# Patient Record
Sex: MALE | Race: WHITE | NOT HISPANIC OR LATINO | Employment: FULL TIME | URBAN - METROPOLITAN AREA
[De-identification: names, ages, dates, MRNs, and addresses within clinical notes are randomized per-mention and may not be internally consistent; named-entity substitution may affect disease eponyms.]

---

## 2017-03-17 ENCOUNTER — OFFICE VISIT (OUTPATIENT)
Dept: FAMILY MEDICINE | Facility: CLINIC | Age: 37
End: 2017-03-17
Payer: COMMERCIAL

## 2017-03-17 VITALS
HEART RATE: 95 BPM | SYSTOLIC BLOOD PRESSURE: 112 MMHG | HEIGHT: 74 IN | DIASTOLIC BLOOD PRESSURE: 82 MMHG | WEIGHT: 242.06 LBS | BODY MASS INDEX: 31.07 KG/M2

## 2017-03-17 DIAGNOSIS — J32.9 SINUSITIS, UNSPECIFIED CHRONICITY, UNSPECIFIED LOCATION: Primary | ICD-10-CM

## 2017-03-17 PROCEDURE — 99999 PR PBB SHADOW E&M-EST. PATIENT-LVL III: CPT | Mod: PBBFAC,,, | Performed by: NURSE PRACTITIONER

## 2017-03-17 PROCEDURE — 1160F RVW MEDS BY RX/DR IN RCRD: CPT | Mod: S$GLB,,, | Performed by: NURSE PRACTITIONER

## 2017-03-17 PROCEDURE — 99213 OFFICE O/P EST LOW 20 MIN: CPT | Mod: S$GLB,,, | Performed by: NURSE PRACTITIONER

## 2017-03-17 RX ORDER — FLUTICASONE PROPIONATE 50 MCG
2 SPRAY, SUSPENSION (ML) NASAL DAILY
Qty: 16 G | Refills: 0 | Status: SHIPPED | OUTPATIENT
Start: 2017-03-17 | End: 2017-07-03

## 2017-03-17 RX ORDER — AMOXICILLIN 875 MG/1
875 TABLET, FILM COATED ORAL EVERY 12 HOURS
Qty: 14 TABLET | Refills: 0 | Status: SHIPPED | OUTPATIENT
Start: 2017-03-17 | End: 2017-03-24

## 2017-03-17 RX ORDER — PROMETHAZINE HYDROCHLORIDE AND DEXTROMETHORPHAN HYDROBROMIDE 6.25; 15 MG/5ML; MG/5ML
5 SYRUP ORAL 3 TIMES DAILY PRN
Qty: 240 ML | Refills: 0 | Status: SHIPPED | OUTPATIENT
Start: 2017-03-17 | End: 2017-03-27

## 2017-03-17 NOTE — PROGRESS NOTES
Subjective:       Patient ID: Sanjeev Kohler is a 36 y.o. male.    Chief Complaint: Cough    HPI Comments: Patient is on imuran for ulcerative colitis.  Lipid Panel due on 1982  TETANUS VACCINE due on 09/02/2000  Influenza Vaccine due on 08/01/2016    Past Medical History:  Past Medical History:  No date: Ulcerative colitis  Past Surgical History:  No date: COLONOSCOPY  Review of patient's allergies indicates:  No Known Allergies  Current Outpatient Prescriptions on File Prior to Visit:  APRISO 0.375 gram Cp24, once daily. Take two pills twice a day, Disp: , Rfl:   azathioprine (IMURAN) 50 mg Tab, 50 mg 2 (two) times daily. , Disp: , Rfl:   (DISCONTINUED) azithromycin (Z-VASHTI) 250 MG tablet, Take 2 tablets by mouth on day one then one tablet daily for days 2-5, Disp: 6 tablet, Rfl: 0    No current facility-administered medications on file prior to visit.     Social History    Marital status:              Spouse name:                       Years of education:                 Number of children:               Occupational History    None on file    Social History Main Topics    Smoking status: Never Smoker                                                                Smokeless status: Never Used                        Alcohol use: No              Drug use: No              Sexual activity: Not on file          Other Topics            Concern    None on file    Social History Narrative    None on file      No family history on file.          Sinus Problem   This is a new problem. Episode onset: x 6 days. The problem has been gradually worsening since onset. There has been no fever. Associated symptoms include congestion, coughing, a hoarse voice, sinus pressure and sneezing. Pertinent negatives include no chills, diaphoresis, ear pain, headaches, neck pain, shortness of breath, sore throat or swollen glands. Past treatments include acetaminophen. The treatment provided no relief.     Review of Systems    Constitutional: Positive for fatigue. Negative for chills, diaphoresis and fever.   HENT: Positive for congestion, hoarse voice, postnasal drip, rhinorrhea, sinus pressure and sneezing. Negative for ear pain and sore throat.    Respiratory: Positive for cough. Negative for chest tightness and shortness of breath.    Cardiovascular: Negative.    Gastrointestinal: Negative.  Negative for abdominal pain, diarrhea, nausea and vomiting.   Genitourinary: Negative.    Musculoskeletal: Negative for neck pain.   Skin: Negative.  Negative for rash and wound.   Neurological: Negative for dizziness and headaches.       Objective:      Physical Exam   Constitutional: He is oriented to person, place, and time. No distress.   HENT:   Head: Head is without raccoon's eyes.   Right Ear: No middle ear effusion.   Left Ear:  No middle ear effusion.   Nose: Mucosal edema and rhinorrhea present. Right sinus exhibits maxillary sinus tenderness. Right sinus exhibits no frontal sinus tenderness. Left sinus exhibits maxillary sinus tenderness. Left sinus exhibits no frontal sinus tenderness.   Mouth/Throat: Uvula is midline. Posterior oropharyngeal erythema present.   Eyes: Pupils are equal, round, and reactive to light.   Neck: Normal range of motion.   Cardiovascular: Normal rate and regular rhythm.  Exam reveals no friction rub.    No murmur heard.  Pulmonary/Chest: Effort normal and breath sounds normal. No respiratory distress. He has no wheezes.   Abdominal: Soft. Bowel sounds are normal. He exhibits no distension. There is no tenderness.   Musculoskeletal: He exhibits no edema or deformity.   Neurological: He is alert and oriented to person, place, and time.   Skin: He is not diaphoretic. No erythema.   Psychiatric: He has a normal mood and affect. His behavior is normal.   Vitals reviewed.      Assessment:       1. Sinusitis, unspecified chronicity, unspecified location        Plan:       1. Sinusitis, unspecified chronicity,  unspecified location  Will go ahead and treat with antibiotics due to immune suppression, follow up if not resolving, immediately for any new or worsening Let GI doctor know about infection.   - amoxicillin (AMOXIL) 875 MG tablet; Take 1 tablet (875 mg total) by mouth every 12 (twelve) hours.  Dispense: 14 tablet; Refill: 0  - fluticasone (FLONASE) 50 mcg/actuation nasal spray; 2 sprays by Each Nare route once daily.  Dispense: 16 g; Refill: 0  - promethazine-dextromethorphan (PROMETHAZINE-DM) 6.25-15 mg/5 mL Syrp; Take 5 mLs by mouth 3 (three) times daily as needed.  Dispense: 240 mL; Refill: 0

## 2017-03-17 NOTE — MR AVS SNAPSHOT
[facility]  [address] [facility] [address]  [facility] [address]  Phone: [phone]  Fax: [phone]                  Sanjeev Kohler   3/17/2017 2:40 PM   Office Visit    Description:  Male : 1980   Provider:  Felisa Gambino NP   Department:  [facility]           Reason for Visit     Cough           Diagnoses this Visit        Comments    Sinusitis, unspecified chronicity, unspecified location    -  Primary            To Do List           Goals (5 Years of Data)     None       These Medications        Disp Refills Start End    amoxicillin (AMOXIL) 875 MG tablet 14 tablet 0 3/17/2017 3/24/2017    Take 1 tablet (875 mg total) by mouth every 12 (twelve) hours. - Oral    Pharmacy: [facility] [address] Ph #: [phone]       fluticasone (FLONASE) 50 mcg/actuation nasal spray 16 g 0 3/17/2017     2 sprays by Each Nare route once daily. - Each Nare    Pharmacy: [facility] [place] [id number] AT [facility] [address] Ph #: [phone]       promethazine-dextromethorphan (PROMETHAZINE-DM) 6.25-15 mg/5 mL Syrp 240 mL 0 3/17/2017 3/27/2017    Take 5 mLs by mouth 3 (three) times daily as needed. - Oral    Pharmacy: [facility] [employer] [place] [id number] AT [facility] [address] Ph #: [phone]         [facility]     [facility] Care [facility] -  Assistance  Registered nurses in the [facility] provide clinical advisement, health education, appointment booking, and other advisory services.  Call for this free service at [phone].             Medications           Message regarding Medications     Verify the changes and/or additions to your medication regime listed below are the same as discussed with your clinician today.  If any of these changes or additions are incorrect, please notify your healthcare provider.       "  START taking these NEW medications        Refills    amoxicillin (AMOXIL) 875 MG tablet 0    Sig: Take 1 tablet (875 mg total) by mouth every 12 (twelve) hours.    Class: Normal    Route: Oral    fluticasone (FLONASE) 50 mcg/actuation nasal spray 0    Si sprays by Each Nare route once daily.    Class: Normal    Route: Each Nare    promethazine-dextromethorphan (PROMETHAZINE-DM) 6.25-15 mg/5 mL Syrp 0    Sig: Take 5 mLs by mouth 3 (three) times daily as needed.    Class: Normal    Route: Oral      STOP taking these medications     azithromycin (Z-VASHTI) 250 MG tablet Take 2 tablets by mouth on day one then one tablet daily for days 2-5           Verify that the below list of medications is an accurate representation of the medications you are currently taking.  If none reported, the list may be blank. If incorrect, please contact your healthcare provider. Carry this list with you in case of emergency.           Current Medications     APRISO 0.375 gram Cp24 once daily. Take two pills twice a day    azathioprine (IMURAN) 50 mg Tab 50 mg 2 (two) times daily.     amoxicillin (AMOXIL) 875 MG tablet Take 1 tablet (875 mg total) by mouth every 12 (twelve) hours.    fluticasone (FLONASE) 50 mcg/actuation nasal spray 2 sprays by Each Nare route once daily.    promethazine-dextromethorphan (PROMETHAZINE-DM) 6.25-15 mg/5 mL Syrp Take 5 mLs by mouth 3 (three) times daily as needed.           Clinical Reference Information           Your Vitals Were     BP Pulse Height Weight BMI    112/82 95 6' 2" (1.88 m) 109.8 kg (242 lb 1 oz) 31.08 kg/m2      Blood Pressure          Most Recent Value    BP  112/82      Allergies as of 3/17/2017     No Known Allergies      Immunizations Administered on Date of Encounter - 3/17/2017     None      [facility] Sign-Up     Activating your [facility] account is as easy as 1-2-3!     1) Visit [email or web address], select Sign Up Now, enter this activation code and your date of birth, then select " Next.  [email or web address]  Expires: 5/1/2017  3:42 PM      2) Create a username and password to use when you visit [facility] in the future and select a security question in case you lose your password and select Next.    3) Enter your e-mail address and click Sign Up!    Additional Information  If you have questions, please e-mail [email or web address] or call [phone] to talk to our [employer] staff. Remember, [employer] is NOT to be used for urgent needs. For medical emergencies, dial 911.         Language Assistance Services     ATTENTION: Language assistance services are available, free of charge. Please call [phone].      ATENCIÓN: Si alma irma, tiene a peña disposición servicios gratuitos de asistencia lingüística. Llame al [phone].     ENZO Ý: N?u b?n nói Ti?ng Vi?t, có các d?ch v? h? tr? ngôn ng? mi?n phí dành cho b?n. G?i s? [phone].         [facility] complies with applicable Federal civil rights laws and does not discriminate on the basis of race, color, national origin, age, disability, or sex.

## 2017-06-09 ENCOUNTER — TELEPHONE (OUTPATIENT)
Dept: FAMILY MEDICINE | Facility: CLINIC | Age: 37
End: 2017-06-09

## 2017-06-09 NOTE — TELEPHONE ENCOUNTER
----- Message from Emma Galeana sent at 6/9/2017  8:45 AM CDT -----  Contact: wife, radha   Cold symptoms   Call back

## 2017-06-12 ENCOUNTER — TELEPHONE (OUTPATIENT)
Dept: FAMILY MEDICINE | Facility: CLINIC | Age: 37
End: 2017-06-12

## 2017-06-12 NOTE — TELEPHONE ENCOUNTER
----- Message from Nini Chang sent at 6/9/2017  1:34 PM CDT -----  Wife (Maribell) calling back concerning appointment for patient/please call back at [phone].

## 2017-07-03 ENCOUNTER — HOSPITAL ENCOUNTER (OUTPATIENT)
Dept: RADIOLOGY | Facility: HOSPITAL | Age: 37
Discharge: HOME | End: 2017-07-03
Attending: NURSE PRACTITIONER
Payer: COMMERCIAL

## 2017-07-03 ENCOUNTER — OFFICE VISIT (OUTPATIENT)
Dept: FAMILY MEDICINE | Facility: CLINIC | Age: 37
End: 2017-07-03
Payer: COMMERCIAL

## 2017-07-03 VITALS
WEIGHT: 241.19 LBS | BODY MASS INDEX: 30.95 KG/M2 | HEART RATE: 109 BPM | OXYGEN SATURATION: 95 % | HEIGHT: 74 IN | DIASTOLIC BLOOD PRESSURE: 80 MMHG | SYSTOLIC BLOOD PRESSURE: 112 MMHG | TEMPERATURE: 99 F

## 2017-07-03 DIAGNOSIS — R05.9 COUGH: Primary | ICD-10-CM

## 2017-07-03 DIAGNOSIS — D84.9 IMMUNOSUPPRESSION: ICD-10-CM

## 2017-07-03 DIAGNOSIS — R50.9 FEVER, UNSPECIFIED FEVER CAUSE: ICD-10-CM

## 2017-07-03 DIAGNOSIS — R05.9 COUGH: ICD-10-CM

## 2017-07-03 PROCEDURE — 99999 PR PBB SHADOW E&M-EST. PATIENT-LVL III: CPT | Mod: PBBFAC,,, | Performed by: NURSE PRACTITIONER

## 2017-07-03 PROCEDURE — 99213 OFFICE O/P EST LOW 20 MIN: CPT | Mod: S$GLB,,, | Performed by: NURSE PRACTITIONER

## 2017-07-03 PROCEDURE — 71020 XR CHEST PA AND LATERAL: CPT | Mod: 26,,, | Performed by: RADIOLOGY

## 2017-07-03 PROCEDURE — 71020 XR CHEST PA AND LATERAL: CPT | Mod: TC,PO

## 2017-07-03 RX ORDER — ALBUTEROL SULFATE 90 UG/1
2 AEROSOL, METERED RESPIRATORY (INHALATION) EVERY 6 HOURS PRN
Qty: 18 G | Refills: 0 | Status: SHIPPED | OUTPATIENT
Start: 2017-07-03 | End: 2017-12-11

## 2017-07-03 RX ORDER — LEVOFLOXACIN 500 MG/1
500 TABLET, FILM COATED ORAL DAILY
Qty: 10 TABLET | Refills: 0 | Status: SHIPPED | OUTPATIENT
Start: 2017-07-03 | End: 2017-12-11

## 2017-07-03 RX ORDER — PROMETHAZINE HYDROCHLORIDE AND DEXTROMETHORPHAN HYDROBROMIDE 6.25; 15 MG/5ML; MG/5ML
5 SYRUP ORAL 3 TIMES DAILY PRN
Qty: 240 ML | Refills: 0 | Status: SHIPPED | OUTPATIENT
Start: 2017-07-03 | End: 2017-07-13

## 2017-07-03 NOTE — PROGRESS NOTES
Subjective:       Patient ID: Sanjeev Kohler is a 36 y.o. male.    Chief Complaint: Cough (congestion) and Fever    Treated in March with Amoxil. Cleared up for a couple of months, got worse again about 4 weeks ago. Went to Urgent care and got a steroid shot and nasal spray, a little improvement but then congestion in chest worsened,  today had 103 fever and worsening congestion. He did take motrin. He is on imuran for ulcerative colitis.  Lipid Panel due on 1982  TETANUS VACCINE due on 09/02/2000    Past Medical History:  Past Medical History:  No date: Ulcerative colitis  Past Surgical History:  No date: COLONOSCOPY  Review of patient's allergies indicates:  No Known Allergies  Current Outpatient Prescriptions on File Prior to Visit:  APRISO 0.375 gram Cp24, once daily. Take two pills twice a day, Disp: , Rfl:   azathioprine (IMURAN) 50 mg Tab, 50 mg 2 (two) times daily. , Disp: , Rfl:   (DISCONTINUED) fluticasone (FLONASE) 50 mcg/actuation nasal spray, 2 sprays by Each Nare route once daily., Disp: 16 g, Rfl: 0    No current facility-administered medications on file prior to visit.     Social History    Marital status:              Spouse name:                       Years of education:                 Number of children:               Occupational History    None on file    Social History Main Topics    Smoking status: Never Smoker                                                                Smokeless tobacco: Never Used                        Alcohol use: No              Drug use: No              Sexual activity: Not on file          Other Topics            Concern    None on file    Social History Narrative    None on file      No family history on file.            Cough   Associated symptoms include chills, a fever and headaches. Pertinent negatives include no postnasal drip, rash, rhinorrhea, sore throat, shortness of breath or wheezing.   Fever    Associated symptoms include coughing and  headaches. Pertinent negatives include no abdominal pain, diarrhea, nausea, rash, sore throat, vomiting or wheezing.     Review of Systems   Constitutional: Positive for chills, fatigue and fever.   HENT: Positive for sinus pressure. Negative for postnasal drip, rhinorrhea and sore throat.    Respiratory: Positive for cough. Negative for shortness of breath and wheezing.    Cardiovascular: Negative.  Negative for leg swelling.   Gastrointestinal: Negative.  Negative for abdominal pain, constipation, diarrhea, nausea and vomiting.   Genitourinary: Negative.  Negative for frequency.   Musculoskeletal: Negative for neck pain.   Skin: Negative for rash.   Neurological: Positive for headaches.       Objective:      Physical Exam   Constitutional: He is oriented to person, place, and time. No distress.   HENT:   Head: Normocephalic and atraumatic.   Right Ear: External ear normal.   Left Ear: External ear normal.   Nose: Nose normal.   Mouth/Throat: Oropharynx is clear and moist. No oropharyngeal exudate.   Eyes: Pupils are equal, round, and reactive to light.   Cardiovascular: Normal heart sounds.  Tachycardia present.  Exam reveals no friction rub.    No murmur heard.  Pulmonary/Chest: No respiratory distress. He has decreased breath sounds. He has no wheezes. He has no rhonchi. He exhibits no tenderness.   Abdominal: Soft. Bowel sounds are normal. He exhibits no distension. There is no tenderness.   Musculoskeletal: He exhibits no edema.   Neurological: He is alert and oriented to person, place, and time.   Skin: No rash noted. He is not diaphoretic. No erythema.   Vitals reviewed.      Assessment:       1. Cough    2. Fever, unspecified fever cause    3. Immunosuppression        Plan:     Patient advised to contact GI doctor immediately and notify them of the fever and cough and get direction on the imuran and aspiro. CXR and cbc today. Start Levaquin, albuterol, and phenergan dm. Return for recheck on Wednesday to  ensure improving go to Er immediately for any new or worsening symptoms.   1. Cough    - CBC auto differential; Future  - X-Ray Chest PA And Lateral; Future  - albuterol 90 mcg/actuation inhaler; Inhale 2 puffs into the lungs every 6 (six) hours as needed for Wheezing. Rescue  Dispense: 18 g; Refill: 0  - levoFLOXacin (LEVAQUIN) 500 MG tablet; Take 1 tablet (500 mg total) by mouth once daily.  Dispense: 10 tablet; Refill: 0  - promethazine-dextromethorphan (PROMETHAZINE-DM) 6.25-15 mg/5 mL Syrp; Take 5 mLs by mouth 3 (three) times daily as needed.  Dispense: 240 mL; Refill: 0    2. Fever, unspecified fever cause    - CBC auto differential; Future  - X-Ray Chest PA And Lateral; Future  - albuterol 90 mcg/actuation inhaler; Inhale 2 puffs into the lungs every 6 (six) hours as needed for Wheezing. Rescue  Dispense: 18 g; Refill: 0  - levoFLOXacin (LEVAQUIN) 500 MG tablet; Take 1 tablet (500 mg total) by mouth once daily.  Dispense: 10 tablet; Refill: 0  - promethazine-dextromethorphan (PROMETHAZINE-DM) 6.25-15 mg/5 mL Syrp; Take 5 mLs by mouth 3 (three) times daily as needed.  Dispense: 240 mL; Refill: 0    3. Immunosuppression    - CBC auto differential; Future  - X-Ray Chest PA And Lateral; Future  - albuterol 90 mcg/actuation inhaler; Inhale 2 puffs into the lungs every 6 (six) hours as needed for Wheezing. Rescue  Dispense: 18 g; Refill: 0  - levoFLOXacin (LEVAQUIN) 500 MG tablet; Take 1 tablet (500 mg total) by mouth once daily.  Dispense: 10 tablet; Refill: 0  - promethazine-dextromethorphan (PROMETHAZINE-DM) 6.25-15 mg/5 mL Syrp; Take 5 mLs by mouth 3 (three) times daily as needed.  Dispense: 240 mL; Refill: 0

## 2017-07-05 ENCOUNTER — OFFICE VISIT (OUTPATIENT)
Dept: FAMILY MEDICINE | Facility: CLINIC | Age: 37
End: 2017-07-05
Payer: COMMERCIAL

## 2017-07-05 VITALS
HEART RATE: 94 BPM | OXYGEN SATURATION: 96 % | DIASTOLIC BLOOD PRESSURE: 78 MMHG | SYSTOLIC BLOOD PRESSURE: 110 MMHG | HEIGHT: 74 IN | TEMPERATURE: 98 F | WEIGHT: 241.38 LBS | RESPIRATION RATE: 19 BRPM | BODY MASS INDEX: 30.98 KG/M2

## 2017-07-05 DIAGNOSIS — R05.9 COUGH: Primary | ICD-10-CM

## 2017-07-05 PROCEDURE — 99999 PR PBB SHADOW E&M-EST. PATIENT-LVL III: CPT | Mod: PBBFAC,,, | Performed by: NURSE PRACTITIONER

## 2017-07-05 PROCEDURE — 99213 OFFICE O/P EST LOW 20 MIN: CPT | Mod: S$GLB,,, | Performed by: NURSE PRACTITIONER

## 2017-07-05 NOTE — PROGRESS NOTES
Subjective:       Patient ID: Sanjeev Kohler is a 36 y.o. male.    Chief Complaint: Cough    HPI   This patient is new to me. He presents to clinic for follow up of cough. He was seen by Felisa Gambino NP on 07/03/17 for this problem, CXR was normal, treated with levaquin as he is on immunosuppressive therapy for ulcerative colitis. He presents today with reports of feeling much better. He denies fever, chills or diaphoresis. He reports he is still coughing but it has improved substantially. He reports he spoke to GI who told him to continue meds as now.   Vitals:    07/05/17 1305   BP: 110/78   Pulse: 94   Resp: 19   Temp: 98.1 °F (36.7 °C)     Review of Systems   Constitutional: Negative.  Negative for appetite change, chills, diaphoresis, fatigue and fever.   HENT: Negative.  Negative for congestion, facial swelling and trouble swallowing.    Eyes: Negative.  Negative for discharge, redness and visual disturbance.   Respiratory: Positive for cough. Negative for chest tightness, shortness of breath and wheezing.    Cardiovascular: Negative.  Negative for chest pain and palpitations.   Gastrointestinal: Negative.  Negative for abdominal pain, constipation and diarrhea.   Genitourinary: Negative.  Negative for difficulty urinating and flank pain.   Musculoskeletal: Negative.  Negative for back pain and neck pain.   Skin: Negative.  Negative for rash and wound.   Neurological: Negative.  Negative for dizziness and light-headedness.   Hematological: Negative.    Psychiatric/Behavioral: Negative.  Negative for confusion. The patient is not nervous/anxious.        Past Medical History:   Diagnosis Date    Ulcerative colitis      Objective:      Physical Exam   Constitutional: He is oriented to person, place, and time. He appears well-developed and well-nourished.  Non-toxic appearance. He does not have a sickly appearance. He does not appear ill. No distress.   HENT:   Head: Normocephalic and atraumatic.   Right Ear:  Hearing and external ear normal.   Left Ear: Hearing and external ear normal.   Nose: Nose normal.   Mouth/Throat: Uvula is midline, oropharynx is clear and moist and mucous membranes are normal.   Eyes: Conjunctivae, EOM and lids are normal. Pupils are equal, round, and reactive to light. Right eye exhibits no discharge. Left eye exhibits no discharge.   Neck: Trachea normal and normal range of motion. Neck supple. No JVD present. No tracheal deviation present.   Cardiovascular: Normal rate, regular rhythm, S1 normal, S2 normal and normal heart sounds.   No extrasystoles are present. Exam reveals no gallop, no S3, no S4, no distant heart sounds and no friction rub.    No murmur heard.  Pulmonary/Chest: Effort normal and breath sounds normal. No accessory muscle usage. No respiratory distress. He has no decreased breath sounds. He has no wheezes. He has no rhonchi. He has no rales. He exhibits no tenderness.   No observed cough while in office or while taking deep breaths   Abdominal: Normal appearance. He exhibits no distension.   Musculoskeletal: Normal range of motion. He exhibits no edema or deformity.   Neurological: He is alert and oriented to person, place, and time. He has normal strength. No sensory deficit. Coordination and gait normal.   Skin: Skin is warm, dry and intact. No lesion and no rash noted. He is not diaphoretic. No erythema. No pallor.   Psychiatric: He has a normal mood and affect. His speech is normal and behavior is normal. Judgment and thought content normal. Cognition and memory are normal.   Nursing note and vitals reviewed.      Assessment:       1. Cough        Plan:       Cough   Continue levquin as now, finish Rx in full   Try delsym otc         Return if symptoms worsen or fail to improve.

## 2017-12-11 ENCOUNTER — HOSPITAL ENCOUNTER (OUTPATIENT)
Dept: RADIOLOGY | Facility: HOSPITAL | Age: 37
Discharge: HOME | End: 2017-12-11
Attending: NURSE PRACTITIONER
Payer: COMMERCIAL

## 2017-12-11 ENCOUNTER — OFFICE VISIT (OUTPATIENT)
Dept: FAMILY MEDICINE | Facility: CLINIC | Age: 37
End: 2017-12-11
Payer: COMMERCIAL

## 2017-12-11 VITALS
DIASTOLIC BLOOD PRESSURE: 80 MMHG | SYSTOLIC BLOOD PRESSURE: 118 MMHG | WEIGHT: 240.31 LBS | HEIGHT: 74 IN | OXYGEN SATURATION: 96 % | BODY MASS INDEX: 30.84 KG/M2 | TEMPERATURE: 99 F | HEART RATE: 106 BPM | RESPIRATION RATE: 18 BRPM

## 2017-12-11 DIAGNOSIS — R50.9 FEVER, UNSPECIFIED FEVER CAUSE: ICD-10-CM

## 2017-12-11 DIAGNOSIS — R68.89 FLU-LIKE SYMPTOMS: Primary | ICD-10-CM

## 2017-12-11 DIAGNOSIS — R68.89 FLU-LIKE SYMPTOMS: ICD-10-CM

## 2017-12-11 LAB
BACTERIA #/AREA URNS HPF: ABNORMAL /HPF
BILIRUB UR QL STRIP: ABNORMAL
CLARITY UR: CLEAR
COLOR UR: YELLOW
FLUAV AG SPEC QL IA: NEGATIVE
FLUBV AG SPEC QL IA: NEGATIVE
GLUCOSE UR QL STRIP: NEGATIVE
HGB UR QL STRIP: NEGATIVE
HYALINE CASTS #/AREA URNS LPF: 2 /LPF
KETONES UR QL STRIP: ABNORMAL
LEUKOCYTE ESTERASE UR QL STRIP: NEGATIVE
MICROSCOPIC COMMENT: ABNORMAL
NITRITE UR QL STRIP: NEGATIVE
PH UR STRIP: 6 [PH] (ref 5–8)
PROT UR QL STRIP: ABNORMAL
RBC #/AREA URNS HPF: 1 /HPF (ref 0–4)
SP GR UR STRIP: >=1.03 (ref 1–1.03)
SPECIMEN SOURCE: NORMAL
URN SPEC COLLECT METH UR: ABNORMAL
WBC #/AREA URNS HPF: 1 /HPF (ref 0–5)

## 2017-12-11 PROCEDURE — 99999 PR PBB SHADOW E&M-EST. PATIENT-LVL III: CPT | Mod: PBBFAC,,, | Performed by: NURSE PRACTITIONER

## 2017-12-11 PROCEDURE — 99214 OFFICE O/P EST MOD 30 MIN: CPT | Mod: S$GLB,,, | Performed by: NURSE PRACTITIONER

## 2017-12-11 PROCEDURE — 81000 URINALYSIS NONAUTO W/SCOPE: CPT | Mod: PO

## 2017-12-11 PROCEDURE — 71020 XR CHEST PA AND LATERAL: CPT | Mod: 26,,, | Performed by: RADIOLOGY

## 2017-12-11 PROCEDURE — 71020 XR CHEST PA AND LATERAL: CPT | Mod: TC,PO

## 2017-12-11 PROCEDURE — 87400 INFLUENZA A/B EACH AG IA: CPT | Mod: PO

## 2017-12-11 RX ORDER — FLUTICASONE PROPIONATE 50 MCG
1 SPRAY, SUSPENSION (ML) NASAL DAILY
Qty: 16 G | Refills: 0 | Status: SHIPPED | OUTPATIENT
Start: 2017-12-11 | End: 2018-07-30

## 2017-12-11 RX ORDER — LEVOFLOXACIN 500 MG/1
500 TABLET, FILM COATED ORAL DAILY
Qty: 10 TABLET | Refills: 0 | Status: SHIPPED | OUTPATIENT
Start: 2017-12-11 | End: 2017-12-27

## 2017-12-11 RX ORDER — ONDANSETRON HYDROCHLORIDE 8 MG/1
8 TABLET, FILM COATED ORAL EVERY 8 HOURS PRN
Qty: 30 TABLET | Refills: 0 | Status: SHIPPED | OUTPATIENT
Start: 2017-12-11 | End: 2018-07-30

## 2017-12-11 NOTE — PROGRESS NOTES
Subjective:       Patient ID: Sanjeev Kohler is a 37 y.o. male.    Chief Complaint: Nausea and Cough    Friday morning patient felt fine, started with a headache, fever up 101, and nausea. No vomiting or diarrhea. Sinus congestion, sore throat at night, no significant coughing. Took tylenol and ibuprofen, fever improved the next morning. [place] Saturday and Sunday during the day but both evening started to feel bad around 3 pm, fever headache nausea and sinus congestion and sore throat. Fever was 102-105 last 2 nights. No fever at all during the day. No headache or sinus congestion or sore throat during the day.  No recent changes in medications. Father has been ill with fatigue and vomiting. Has not eaten anything unusual.   He is on immunosuppressive therapy.   Lipid Panel due on 1982  TETANUS VACCINE due on 09/02/2000  Influenza Vaccine due on 08/01/2017    Past Medical History:  Past Medical History:  No date: Ulcerative colitis  Past Surgical History:  No date: COLONOSCOPY  Review of patient's allergies indicates:  No Known Allergies  Current Outpatient Prescriptions on File Prior to Visit:  APRISO 0.375 gram Cp24, once daily. Take two pills twice a day, Disp: , Rfl:   azathioprine (IMURAN) 50 mg Tab, 50 mg 2 (two) times daily. , Disp: , Rfl:   (DISCONTINUED) albuterol 90 mcg/actuation inhaler, Inhale 2 puffs into the lungs every 6 (six) hours as needed for Wheezing. Rescue, Disp: 18 g, Rfl: 0  (DISCONTINUED) levoFLOXacin (LEVAQUIN) 500 MG tablet, Take 1 tablet (500 mg total) by mouth once daily., Disp: 10 tablet, Rfl: 0    No current facility-administered medications on file prior to visit.     Social History    Marital status:              Spouse name:                       Years of education:                 Number of children:               Occupational History    None on file    Social History Main Topics    Smoking status: Never Smoker                                                                 Smokeless tobacco: Never Used                        Alcohol use: No              Drug use: No              Sexual activity: Not on file          Other Topics            Concern    None on file    Social History Narrative    None on file      No family history on file.            Review of Systems   Constitutional: Positive for appetite change (decreased appetite), chills, diaphoresis, fatigue and fever.   HENT: Positive for sinus pressure (pressure and congestion at night) and sore throat (only at night). Negative for postnasal drip, rhinorrhea and sinus pain.    Eyes: Negative.  Negative for pain, discharge and redness.   Respiratory: Positive for cough. Negative for shortness of breath and wheezing.    Cardiovascular: Negative.  Negative for chest pain and leg swelling.   Gastrointestinal: Positive for nausea (only at night). Negative for abdominal distention, abdominal pain, anal bleeding, blood in stool, constipation, diarrhea, rectal pain and vomiting.        Denies gerd or heartburn   Genitourinary: Negative.  Negative for dysuria, frequency and urgency.   Musculoskeletal: Positive for arthralgias and myalgias.   Skin: Negative.  Negative for rash and wound.   Neurological: Positive for headaches (only at night). Negative for dizziness, light-headedness and numbness.   Hematological: Negative.  Negative for adenopathy. Does not bruise/bleed easily.   Psychiatric/Behavioral: Negative.        Objective:      Physical Exam   Constitutional: He is oriented to person, place, and time. No distress.   HENT:   Head: Normocephalic and atraumatic.   Right Ear: External ear normal.   Left Ear: External ear normal.   Nose: Nose normal.   Mouth/Throat: Oropharynx is clear and moist. No oropharyngeal exudate.   Eyes: Pupils are equal, round, and reactive to light.   Neck: Normal range of motion.   Cardiovascular: Normal rate and regular rhythm.  Exam reveals no friction rub.    No murmur heard.  Pulmonary/Chest:  Effort normal and breath sounds normal. No respiratory distress. He has no wheezes.   Abdominal: Soft. Bowel sounds are normal. He exhibits no distension. There is no tenderness.   Musculoskeletal: He exhibits no edema.   Neurological: He is alert and oriented to person, place, and time.   Skin: No rash noted. He is not diaphoretic.   Vitals reviewed.      Assessment:       1. Flu-like symptoms    2. Fever, unspecified fever cause        Plan:       1. Flu-like symptoms  Flu negative, strep negative. UA concentrated but no bacteria. Increase fluids, follow up if not resolved by Wednesday, go to ed for worsening or new symptoms.  - Influenza antigen Nasal Swab  - CBC auto differential; Future  - Comprehensive metabolic panel; Future  - X-Ray Chest PA And Lateral; Future  - URINALYSIS  - POCT Rapid Strep A  - ondansetron (ZOFRAN) 8 MG tablet; Take 1 tablet (8 mg total) by mouth every 8 (eight) hours as needed for Nausea.  Dispense: 30 tablet; Refill: 0  - fluticasone (FLONASE) 50 mcg/actuation nasal spray; 1 spray by Each Nare route once daily.  Dispense: 16 g; Refill: 0    2. Fever, unspecified fever cause  CXR shows no pneumonia. Labs pending. Start levaquin due to high fever, on immunosuppressants. Contact GI right away and let them know about fever. Follow up if not resolved by Wednesday, go to ed for worsening or new symptoms.  - CBC auto differential; Future  - Comprehensive metabolic panel; Future  - X-Ray Chest PA And Lateral; Future  - URINALYSIS  - POCT Rapid Strep A  - PSA, Screening; Future  - levoFLOXacin (LEVAQUIN) 500 MG tablet; Take 1 tablet (500 mg total) by mouth once daily.  Dispense: 10 tablet; Refill: 0

## 2017-12-13 ENCOUNTER — PATIENT MESSAGE (OUTPATIENT)
Dept: FAMILY MEDICINE | Facility: CLINIC | Age: 37
End: 2017-12-13

## 2017-12-13 NOTE — TELEPHONE ENCOUNTER
----- Message from Christi Butt sent at 12/13/2017 12:47 PM CST -----  Contact: self  Patient [phone] is returning call from earlier today/please call

## 2017-12-19 ENCOUNTER — PATIENT MESSAGE (OUTPATIENT)
Dept: FAMILY MEDICINE | Facility: CLINIC | Age: 37
End: 2017-12-19

## 2017-12-19 RX ORDER — BENZONATATE 100 MG/1
100 CAPSULE ORAL 3 TIMES DAILY PRN
Qty: 30 CAPSULE | Refills: 0 | Status: SHIPPED | OUTPATIENT
Start: 2017-12-19 | End: 2017-12-29

## 2017-12-19 RX ORDER — PROMETHAZINE HYDROCHLORIDE AND DEXTROMETHORPHAN HYDROBROMIDE 6.25; 15 MG/5ML; MG/5ML
5 SYRUP ORAL 3 TIMES DAILY PRN
Qty: 240 ML | Refills: 0 | Status: SHIPPED | OUTPATIENT
Start: 2017-12-19 | End: 2017-12-27

## 2017-12-19 NOTE — TELEPHONE ENCOUNTER
Pt requested rx for cough went to ER on 12/172017, pt tested positive for mononucleosis and pancytopenia. Pt was given a liter of normal saline, tylenol and motrin. Please advise.

## 2017-12-20 NOTE — TELEPHONE ENCOUNTER
Pt is requesting to speak with you in regards to more symptoms not associated with mono, would you like to schedule him for another visit or call him. Please advise

## 2017-12-26 ENCOUNTER — PATIENT MESSAGE (OUTPATIENT)
Dept: FAMILY MEDICINE | Facility: CLINIC | Age: 37
End: 2017-12-26

## 2017-12-26 ENCOUNTER — TELEPHONE (OUTPATIENT)
Dept: FAMILY MEDICINE | Facility: CLINIC | Age: 37
End: 2017-12-26

## 2017-12-26 NOTE — TELEPHONE ENCOUNTER
I spoke with pt he is not much better. He ran 103. Fever yesterday at 5:30pm. His temp now is 100.0 now. He is complaining of not sleeping due to cough. He is c/o costochondritis. He said he has had it before and knows what if feels like. He will go to urgent care on [address].

## 2017-12-26 NOTE — TELEPHONE ENCOUNTER
----- Message from Annie Rondon sent at 12/26/2017  8:30 AM CST -----   Please call pt at [phone] / returning call

## 2017-12-26 NOTE — TELEPHONE ENCOUNTER
----- Message from Vivian Escalante sent at 12/26/2017  7:41 AM CST -----  Contact: Wife Maribell [phone]  Call placed to pod. Patient returned your call, please call back.  Thank you!

## 2017-12-27 PROBLEM — K51.30 ULCERATIVE RECTOSIGMOIDITIS WITHOUT COMPLICATION: Status: ACTIVE | Noted: 2017-12-27

## 2017-12-27 PROBLEM — T45.1X5A EFFECT OF IMMUNOSUPPRESSANT THERAPY: Status: ACTIVE | Noted: 2017-12-27

## 2017-12-28 NOTE — TELEPHONE ENCOUNTER
I spoke with pt, I read him Felisa's recc per her message. He understands. He made an appointment and saw a PCP yesterday. He understands if he ever feels worse he should follow up to make sure he is not developing bacterial infection. He understands this will take several weeks for mono to resolve

## 2018-02-16 PROBLEM — B00.9 HERPES SIMPLEX: Status: ACTIVE | Noted: 2018-02-16

## 2022-04-14 ENCOUNTER — OFFICE VISIT (OUTPATIENT)
Dept: FAMILY MEDICINE | Facility: CLINIC | Age: 42
End: 2022-04-14
Payer: COMMERCIAL

## 2022-04-14 VITALS
SYSTOLIC BLOOD PRESSURE: 120 MMHG | HEART RATE: 88 BPM | BODY MASS INDEX: 33.86 KG/M2 | OXYGEN SATURATION: 98 % | WEIGHT: 263.88 LBS | TEMPERATURE: 98 F | DIASTOLIC BLOOD PRESSURE: 74 MMHG | HEIGHT: 74 IN

## 2022-04-14 DIAGNOSIS — Z00.00 ROUTINE PHYSICAL EXAMINATION: ICD-10-CM

## 2022-04-14 DIAGNOSIS — J40 BRONCHITIS: Primary | ICD-10-CM

## 2022-04-14 PROCEDURE — 99204 PR OFFICE/OUTPT VISIT, NEW, LEVL IV, 45-59 MIN: ICD-10-PCS | Mod: S$GLB,,, | Performed by: INTERNAL MEDICINE

## 2022-04-14 PROCEDURE — 1160F RVW MEDS BY RX/DR IN RCRD: CPT | Mod: CPTII,S$GLB,, | Performed by: INTERNAL MEDICINE

## 2022-04-14 PROCEDURE — 3078F PR MOST RECENT DIASTOLIC BLOOD PRESSURE < 80 MM HG: ICD-10-PCS | Mod: CPTII,S$GLB,, | Performed by: INTERNAL MEDICINE

## 2022-04-14 PROCEDURE — 3074F SYST BP LT 130 MM HG: CPT | Mod: CPTII,S$GLB,, | Performed by: INTERNAL MEDICINE

## 2022-04-14 PROCEDURE — 99999 PR PBB SHADOW E&M-NEW PATIENT-LVL III: CPT | Mod: PBBFAC,,, | Performed by: INTERNAL MEDICINE

## 2022-04-14 PROCEDURE — 3008F PR BODY MASS INDEX (BMI) DOCUMENTED: ICD-10-PCS | Mod: CPTII,S$GLB,, | Performed by: INTERNAL MEDICINE

## 2022-04-14 PROCEDURE — 1159F MED LIST DOCD IN RCRD: CPT | Mod: CPTII,S$GLB,, | Performed by: INTERNAL MEDICINE

## 2022-04-14 PROCEDURE — 3074F PR MOST RECENT SYSTOLIC BLOOD PRESSURE < 130 MM HG: ICD-10-PCS | Mod: CPTII,S$GLB,, | Performed by: INTERNAL MEDICINE

## 2022-04-14 PROCEDURE — 3078F DIAST BP <80 MM HG: CPT | Mod: CPTII,S$GLB,, | Performed by: INTERNAL MEDICINE

## 2022-04-14 PROCEDURE — 99204 OFFICE O/P NEW MOD 45 MIN: CPT | Mod: S$GLB,,, | Performed by: INTERNAL MEDICINE

## 2022-04-14 PROCEDURE — 99999 PR PBB SHADOW E&M-NEW PATIENT-LVL III: ICD-10-PCS | Mod: PBBFAC,,, | Performed by: INTERNAL MEDICINE

## 2022-04-14 PROCEDURE — 1159F PR MEDICATION LIST DOCUMENTED IN MEDICAL RECORD: ICD-10-PCS | Mod: CPTII,S$GLB,, | Performed by: INTERNAL MEDICINE

## 2022-04-14 PROCEDURE — 1160F PR REVIEW ALL MEDS BY PRESCRIBER/CLIN PHARMACIST DOCUMENTED: ICD-10-PCS | Mod: CPTII,S$GLB,, | Performed by: INTERNAL MEDICINE

## 2022-04-14 PROCEDURE — 3008F BODY MASS INDEX DOCD: CPT | Mod: CPTII,S$GLB,, | Performed by: INTERNAL MEDICINE

## 2022-04-14 RX ORDER — CEFUROXIME AXETIL 500 MG/1
500 TABLET ORAL 2 TIMES DAILY
Qty: 20 TABLET | Refills: 0 | Status: SHIPPED | OUTPATIENT
Start: 2022-04-14 | End: 2022-04-24

## 2022-04-14 RX ORDER — BENZONATATE 100 MG/1
CAPSULE ORAL
Qty: 45 CAPSULE | Refills: 0 | Status: SHIPPED | OUTPATIENT
Start: 2022-04-14

## 2022-04-14 RX ORDER — CODEINE PHOSPHATE AND GUAIFENESIN 10; 100 MG/5ML; MG/5ML
5 SOLUTION ORAL EVERY 6 HOURS PRN
Qty: 180 ML | Refills: 0 | Status: SHIPPED | OUTPATIENT
Start: 2022-04-14 | End: 2022-04-24

## 2022-04-14 NOTE — PATIENT INSTRUCTIONS
Patient advised to use:  - intranasal steroid - Flonase  - Over the counter 24h Allegra (no drowsiness); generic ok.   - Benadryl at night  Use Mucinex or quaifenesin daily to thin mucous congestion.  Patient advised to take Mucinex twice daily, use nasal saline spray or wash (distilled water through a Neti Pot using  salt water (1/2 - 1 table spoon per 8 - 10 oz of cool water), increase oral fluids, and try warm steam inhalation.

## 2022-04-14 NOTE — PROGRESS NOTES
Subjective:       Patient ID: Sanjeev Kohler is a 41 y.o. male.    Chief Complaint: Sinus Problem    New patient complains of 1.5 weeks of moderate sinus pressure that progressed to a green sputum cough.  Associated with subjective fever last night.    UC - controlled on Imuran     Review of Systems   Constitutional: Negative for appetite change and fever.   HENT: Positive for postnasal drip and sinus pressure. Negative for nosebleeds and trouble swallowing.    Eyes: Negative for discharge and visual disturbance.   Respiratory: Positive for cough. Negative for choking and shortness of breath.    Cardiovascular: Negative for chest pain and palpitations.   Gastrointestinal: Negative for abdominal pain, nausea and vomiting.   Musculoskeletal: Negative for arthralgias and joint swelling.   Skin: Negative for rash and wound.   Neurological: Negative for dizziness and syncope.   Psychiatric/Behavioral: Negative for confusion and dysphoric mood.       Objective:      Vitals:    04/14/22 0830   BP: 120/74   Pulse: 88   Temp: 98.1 °F (36.7 °C)     Physical Exam  Vitals reviewed.   Eyes:      Conjunctiva/sclera: Conjunctivae normal.   Cardiovascular:      Rate and Rhythm: Normal rate and regular rhythm.   Pulmonary:      Effort: Pulmonary effort is normal.      Breath sounds: Normal breath sounds.   Musculoskeletal:      Cervical back: Normal range of motion.      Comments: Normal ROM bilateral    Skin:     General: Skin is warm and dry.   Neurological:      Cranial Nerves: No cranial nerve deficit (grossly intact).   Psychiatric:      Comments: Alert and orientated           Assessment:       1. Bronchitis    2. Routine physical examination        Plan:       Bronchitis  -     cefUROXime (CEFTIN) 500 MG tablet; Take 1 tablet (500 mg total) by mouth 2 (two) times daily. for 10 days  Dispense: 20 tablet; Refill: 0  -     benzonatate (TESSALON) 100 MG capsule; 1 - 2 po every 6 hours prn cough  Dispense: 45 capsule; Refill: 0  -      guaiFENesin-codeine 100-10 mg/5 ml (GUAIFENESIN AC)  mg/5 mL syrup; Take 5 mLs by mouth every 6 (six) hours as needed for Cough.  Dispense: 180 mL; Refill: 0    Routine physical examination  -     Comprehensive Metabolic Panel; Future; Expected date: 04/14/2022  -     CBC Auto Differential; Future; Expected date: 04/14/2022  -     Lipid Panel; Future; Expected date: 04/14/2022            Medication List with Changes/Refills   New Medications    BENZONATATE (TESSALON) 100 MG CAPSULE    1 - 2 po every 6 hours prn cough    CEFUROXIME (CEFTIN) 500 MG TABLET    Take 1 tablet (500 mg total) by mouth 2 (two) times daily. for 10 days    GUAIFENESIN-CODEINE 100-10 MG/5 ML (GUAIFENESIN AC)  MG/5 ML SYRUP    Take 5 mLs by mouth every 6 (six) hours as needed for Cough.   Current Medications    APRISO 0.375 GRAM CP24    once daily. Take two pills twice a day    AZATHIOPRINE (IMURAN) 50 MG TAB    50 mg 2 (two) times daily.    Discontinued Medications    AZITHROMYCIN (Z-VASHTI) 250 MG TABLET    Use as directed.    GUAIFENESIN (MUCINEX) 600 MG 12 HR TABLET    Take 1,200 mg by mouth 2 (two) times daily.    PSEUDOEPHEDRINE (SUDAFED) 120 MG 12 HR TABLET    Take 120 mg by mouth every 12 (twelve) hours.       Continue current management and monitor.    Counseled on regular exercise, maintenance of a healthy weight, balanced diet rich in fruits/vegetables and lean protein, and avoidance of unhealthy habits like smoking and excessive alcohol intake.   Also, counseled on importance of being compliant with medication, health appointments, diet and exercise.     Follow up in about 5 weeks (around 5/18/2022).  Annual

## 2023-07-25 NOTE — TELEPHONE ENCOUNTER
Mono takes weeks to resolve, the cough, fatigue and low grade fever may persist for quite a long time.  Anytime he has any worsening he should follow up to be sure he is not developing a bacterial infection. This patient needs a PCP, he is on long term medications that require monitoring and he has not seen a physician here, only [facility], I am listed as his primary and this is not allowed at [facility], he needs a new patient appointment with a PCP.    <-- Click to add NO pertinent Past Medical History

## 2024-11-18 ENCOUNTER — OFFICE VISIT (OUTPATIENT)
Dept: FAMILY MEDICINE | Facility: CLINIC | Age: 44
End: 2024-11-18
Payer: COMMERCIAL

## 2024-11-18 VITALS
WEIGHT: 266.13 LBS | SYSTOLIC BLOOD PRESSURE: 134 MMHG | HEART RATE: 89 BPM | OXYGEN SATURATION: 95 % | TEMPERATURE: 98 F | HEIGHT: 74 IN | BODY MASS INDEX: 34.15 KG/M2 | DIASTOLIC BLOOD PRESSURE: 88 MMHG

## 2024-11-18 DIAGNOSIS — M62.838 NECK MUSCLE SPASM: Primary | ICD-10-CM

## 2024-11-18 DIAGNOSIS — K51.30 ULCERATIVE RECTOSIGMOIDITIS WITHOUT COMPLICATION: ICD-10-CM

## 2024-11-18 PROCEDURE — 99214 OFFICE O/P EST MOD 30 MIN: CPT | Mod: S$GLB,,, | Performed by: NURSE PRACTITIONER

## 2024-11-18 PROCEDURE — 3079F DIAST BP 80-89 MM HG: CPT | Mod: CPTII,S$GLB,, | Performed by: NURSE PRACTITIONER

## 2024-11-18 PROCEDURE — 3075F SYST BP GE 130 - 139MM HG: CPT | Mod: CPTII,S$GLB,, | Performed by: NURSE PRACTITIONER

## 2024-11-18 PROCEDURE — 1159F MED LIST DOCD IN RCRD: CPT | Mod: CPTII,S$GLB,, | Performed by: NURSE PRACTITIONER

## 2024-11-18 PROCEDURE — 99999 PR PBB SHADOW E&M-EST. PATIENT-LVL III: CPT | Mod: PBBFAC,,, | Performed by: NURSE PRACTITIONER

## 2024-11-18 PROCEDURE — 3008F BODY MASS INDEX DOCD: CPT | Mod: CPTII,S$GLB,, | Performed by: NURSE PRACTITIONER

## 2024-11-18 RX ORDER — DICLOFENAC SODIUM 10 MG/G
2 GEL TOPICAL 4 TIMES DAILY
Qty: 450 G | Refills: 0 | Status: SHIPPED | OUTPATIENT
Start: 2024-11-18

## 2024-11-18 RX ORDER — METHYLPREDNISOLONE 4 MG/1
TABLET ORAL
Qty: 21 EACH | Refills: 0 | Status: SHIPPED | OUTPATIENT
Start: 2024-11-18 | End: 2024-12-09

## 2024-11-18 RX ORDER — TIZANIDINE 4 MG/1
4 TABLET ORAL NIGHTLY PRN
Qty: 20 TABLET | Refills: 0 | Status: SHIPPED | OUTPATIENT
Start: 2024-11-18

## 2024-11-18 NOTE — PROGRESS NOTES
Subjective:       Patient ID: Sanjeev Kohler is a 44 y.o. male.    Chief Complaint: Neck Pain    HPI  New patient to me presents for right sided neck pain  Slept on neck wrong--woke up 2 days ago with pain, stiffness.   Denies numbness, tingling, or radiating pain  Denies hx cervical issues  Has been taking 600mg ibuprofen TID without improvement     UC well controlled-- Dr Schmitz     Vitals:    11/18/24 0815   BP: 134/88   Pulse: 89   Temp: 97.7 °F (36.5 °C)     Review of Systems   Constitutional:  Negative for activity change and unexpected weight change.   HENT:  Negative for hearing loss, rhinorrhea and trouble swallowing.    Eyes:  Negative for discharge and visual disturbance.   Respiratory:  Negative for chest tightness and wheezing.    Cardiovascular:  Negative for chest pain and palpitations.   Gastrointestinal:  Negative for abdominal pain, anal bleeding, blood in stool, constipation, diarrhea and vomiting.   Endocrine: Negative for polydipsia and polyuria.   Genitourinary:  Negative for difficulty urinating, hematuria and urgency.   Musculoskeletal:  Positive for neck pain. Negative for arthralgias and joint swelling.   Neurological:  Negative for weakness and headaches.   Psychiatric/Behavioral:  Negative for confusion and dysphoric mood.        Past Medical History:   Diagnosis Date    Ulcerative colitis     Ulcerative colitis      Objective:      Physical Exam  Constitutional:       General: He is not in acute distress.     Appearance: He is well-developed. He is not ill-appearing, toxic-appearing or diaphoretic.   HENT:      Right Ear: Hearing normal.      Left Ear: Hearing normal.   Pulmonary:      Effort: No tachypnea or respiratory distress.   Musculoskeletal:      Cervical back: No edema, erythema or rigidity. Pain with movement and muscular tenderness present. No spinous process tenderness.   Skin:     Coloration: Skin is not pale.   Neurological:      Mental Status: He is alert and oriented to  person, place, and time.   Psychiatric:         Speech: Speech normal.         Behavior: Behavior normal.         Thought Content: Thought content normal.         Judgment: Judgment normal.         Assessment:       1. 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  Dispense: 20 tablet; Refill: 0  -     methylPREDNISolone (MEDROL DOSEPACK) 4 mg tablet; use as directed  Dispense: 21 each; Refill: 0  -     diclofenac sodium (VOLTAREN ARTHRITIS PAIN) 1 % Gel; Apply 2 g topically 4 (four) times daily.  Dispense: 450 g; Refill: 0    Ulcerative rectosigmoiditis without complication          education provided on supportive care, symptom monitoring and return precautions     Follow up for further evaluation if s/s worsen, fail to improve, or new symptoms arise.    Medication List with Changes/Refills   New Medications    DICLOFENAC SODIUM (VOLTAREN ARTHRITIS PAIN) 1 % GEL    Apply 2 g topically 4 (four) times daily.    METHYLPREDNISOLONE (MEDROL DOSEPACK) 4 MG TABLET    use as directed    TIZANIDINE (ZANAFLEX) 4 MG TABLET    Take 1 tablet (4 mg total) by mouth nightly as needed.   Current Medications    APRISO 0.375 GRAM CP24    once daily. Take two pills twice a day    AZATHIOPRINE (IMURAN) 50 MG TAB    50 mg 2 (two) times daily.    Discontinued Medications    BENZONATATE (TESSALON) 100 MG CAPSULE    1 - 2 po every 6 hours prn cough

## 2025-06-16 ENCOUNTER — OFFICE VISIT (OUTPATIENT)
Dept: FAMILY MEDICINE | Facility: CLINIC | Age: 45
End: 2025-06-16
Payer: COMMERCIAL

## 2025-06-16 VITALS
HEIGHT: 74 IN | DIASTOLIC BLOOD PRESSURE: 88 MMHG | OXYGEN SATURATION: 97 % | BODY MASS INDEX: 34.52 KG/M2 | HEART RATE: 100 BPM | WEIGHT: 268.94 LBS | TEMPERATURE: 98 F | SYSTOLIC BLOOD PRESSURE: 130 MMHG

## 2025-06-16 DIAGNOSIS — J32.9 SINUSITIS, UNSPECIFIED CHRONICITY, UNSPECIFIED LOCATION: Primary | ICD-10-CM

## 2025-06-16 PROCEDURE — 3079F DIAST BP 80-89 MM HG: CPT | Mod: CPTII,S$GLB,, | Performed by: NURSE PRACTITIONER

## 2025-06-16 PROCEDURE — 3075F SYST BP GE 130 - 139MM HG: CPT | Mod: CPTII,S$GLB,, | Performed by: NURSE PRACTITIONER

## 2025-06-16 PROCEDURE — 1159F MED LIST DOCD IN RCRD: CPT | Mod: CPTII,S$GLB,, | Performed by: NURSE PRACTITIONER

## 2025-06-16 PROCEDURE — 3008F BODY MASS INDEX DOCD: CPT | Mod: CPTII,S$GLB,, | Performed by: NURSE PRACTITIONER

## 2025-06-16 PROCEDURE — 99213 OFFICE O/P EST LOW 20 MIN: CPT | Mod: S$GLB,,, | Performed by: NURSE PRACTITIONER

## 2025-06-16 PROCEDURE — 99999 PR PBB SHADOW E&M-EST. PATIENT-LVL III: CPT | Mod: PBBFAC,,, | Performed by: NURSE PRACTITIONER

## 2025-06-16 RX ORDER — AMOXICILLIN AND CLAVULANATE POTASSIUM 875; 125 MG/1; MG/1
1 TABLET, FILM COATED ORAL EVERY 12 HOURS
Qty: 20 TABLET | Refills: 0 | Status: SHIPPED | OUTPATIENT
Start: 2025-06-16 | End: 2025-06-16

## 2025-06-16 RX ORDER — AMOXICILLIN AND CLAVULANATE POTASSIUM 875; 125 MG/1; MG/1
1 TABLET, FILM COATED ORAL EVERY 12 HOURS
Qty: 20 TABLET | Refills: 0 | Status: SHIPPED | OUTPATIENT
Start: 2025-06-16 | End: 2025-06-26

## 2025-06-16 NOTE — PROGRESS NOTES
Subjective:       Patient ID: Sanjeev Kohler is a 44 y.o. male.    Chief Complaint: Cough and Nasal Congestion    HPI  Nasal congestion, sinus congestion/pressure, post nasal drip and cough ongoing x 3 weeks. Taking otc combo cold/sinus med without improvement     Vitals:    06/16/25 1154   BP: 130/88   Pulse: 100   Temp:      Review of Systems    Past Medical History:   Diagnosis Date    Ulcerative colitis     Ulcerative colitis      Objective:      Physical Exam  Vitals and nursing note reviewed.   Constitutional:       General: He is not in acute distress.     Appearance: He is not diaphoretic.   HENT:      Head: Normocephalic.      Right Ear: A middle ear effusion is present.      Left Ear: A middle ear effusion is present.      Mouth/Throat:      Pharynx: Oropharynx is clear.   Eyes:      General: Lids are normal.         Right eye: No discharge.         Left eye: No discharge.   Neck:      Trachea: No tracheal deviation.   Cardiovascular:      Rate and Rhythm: Normal rate and regular rhythm.      Heart sounds: Normal heart sounds.   Pulmonary:      Effort: Pulmonary effort is normal.      Breath sounds: Normal breath sounds.   Skin:     Coloration: Skin is not pale.   Neurological:      Mental Status: He is alert and oriented to person, place, and time.   Psychiatric:         Speech: Speech normal.         Behavior: Behavior normal.         Thought Content: Thought content normal.         Judgment: Judgment normal.         Assessment:       1. Sinusitis, unspecified chronicity, unspecified location        Plan:       Sinusitis, unspecified chronicity, unspecified location  -     amoxicillin-clavulanate 875-125mg (AUGMENTIN) 875-125 mg per tablet; Take 1 tablet by mouth every 12 (twelve) hours. for 10 days  Dispense: 20 tablet; Refill: 0      education provided on supportive care, symptom monitoring and return precautions     Follow up for further evaluation if s/s worsen, fail to improve, or new symptoms  arise.    Medication List with Changes/Refills   New Medications    AMOXICILLIN-CLAVULANATE 875-125MG (AUGMENTIN) 875-125 MG PER TABLET    Take 1 tablet by mouth every 12 (twelve) hours. for 10 days   Current Medications    APRISO 0.375 GRAM CP24    once daily. Take two pills twice a day    AZATHIOPRINE (IMURAN) 50 MG TAB    50 mg 2 (two) times daily.    Discontinued Medications    DICLOFENAC SODIUM (VOLTAREN ARTHRITIS PAIN) 1 % GEL    Apply 2 g topically 4 (four) times daily.    TIZANIDINE (ZANAFLEX) 4 MG TABLET    Take 1 tablet (4 mg total) by mouth nightly as needed.